# Patient Record
Sex: FEMALE | Race: WHITE | NOT HISPANIC OR LATINO
[De-identification: names, ages, dates, MRNs, and addresses within clinical notes are randomized per-mention and may not be internally consistent; named-entity substitution may affect disease eponyms.]

---

## 2022-11-14 ENCOUNTER — NON-APPOINTMENT (OUTPATIENT)
Age: 67
End: 2022-11-14

## 2023-07-24 PROBLEM — Z00.00 ENCOUNTER FOR PREVENTIVE HEALTH EXAMINATION: Status: ACTIVE | Noted: 2023-07-24

## 2023-08-03 ENCOUNTER — APPOINTMENT (OUTPATIENT)
Dept: ORTHOPEDIC SURGERY | Facility: CLINIC | Age: 68
End: 2023-08-03
Payer: MEDICARE

## 2023-08-03 DIAGNOSIS — S82.091A OTHER FRACTURE OF RIGHT PATELLA, INITIAL ENCOUNTER FOR CLOSED FRACTURE: ICD-10-CM

## 2023-08-03 DIAGNOSIS — Z78.9 OTHER SPECIFIED HEALTH STATUS: ICD-10-CM

## 2023-08-03 PROCEDURE — 99204 OFFICE O/P NEW MOD 45 MIN: CPT

## 2023-08-03 PROCEDURE — 73562 X-RAY EXAM OF KNEE 3: CPT | Mod: RT

## 2023-08-03 PROCEDURE — 99214 OFFICE O/P EST MOD 30 MIN: CPT

## 2023-08-03 RX ORDER — ALBUTEROL SULFATE 4 MG/1
TABLET ORAL
Refills: 0 | Status: ACTIVE | COMMUNITY

## 2023-08-03 RX ORDER — FLUTICASONE PROPIONATE AND SALMETEROL XINAFOATE 230; 21 UG/1; UG/1
AEROSOL, METERED RESPIRATORY (INHALATION)
Refills: 0 | Status: ACTIVE | COMMUNITY

## 2023-08-03 RX ORDER — ROSUVASTATIN CALCIUM 5 MG/1
TABLET, FILM COATED ORAL
Refills: 0 | Status: ACTIVE | COMMUNITY

## 2023-08-03 RX ORDER — OLMESARTAN MEDOXOMIL-HYDROCHLOROTHIAZIDE 25; 40 MG/1; MG/1
TABLET, FILM COATED ORAL
Refills: 0 | Status: ACTIVE | COMMUNITY

## 2023-08-03 NOTE — PHYSICAL EXAM
[4___] : hamstring 4[unfilled]/5 [Right] : right knee [] : patient ambulates without assistive device [Lateral] : lateral [[place]ard] : skyline [AP Standing] : anteroposterior standing [Moderate patellofemoral OA] : Moderate patellofemoral OA [FreeTextEntry9] : possible fracture through superior patella osteophyte [TWNoteComboBox7] : flexion 100 degrees
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
## 2023-09-14 ENCOUNTER — APPOINTMENT (OUTPATIENT)
Dept: ORTHOPEDIC SURGERY | Facility: CLINIC | Age: 68
End: 2023-09-14
Payer: MEDICARE

## 2023-09-14 DIAGNOSIS — S80.01XA CONTUSION OF RIGHT KNEE, INITIAL ENCOUNTER: ICD-10-CM

## 2023-09-14 DIAGNOSIS — M94.261 CHONDROMALACIA, RIGHT KNEE: ICD-10-CM

## 2023-09-14 PROCEDURE — 99213 OFFICE O/P EST LOW 20 MIN: CPT

## 2023-10-26 ENCOUNTER — APPOINTMENT (OUTPATIENT)
Dept: ORTHOPEDIC SURGERY | Facility: CLINIC | Age: 68
End: 2023-10-26

## 2024-06-17 ENCOUNTER — APPOINTMENT (OUTPATIENT)
Dept: ORTHOPEDIC SURGERY | Facility: CLINIC | Age: 69
End: 2024-06-17
Payer: MEDICARE

## 2024-06-17 DIAGNOSIS — E78.00 PURE HYPERCHOLESTEROLEMIA, UNSPECIFIED: ICD-10-CM

## 2024-06-17 DIAGNOSIS — J45.909 UNSPECIFIED ASTHMA, UNCOMPLICATED: ICD-10-CM

## 2024-06-17 DIAGNOSIS — M84.375A STRESS FRACTURE, LEFT FOOT, INITIAL ENCOUNTER FOR FRACTURE: ICD-10-CM

## 2024-06-17 DIAGNOSIS — I10 ESSENTIAL (PRIMARY) HYPERTENSION: ICD-10-CM

## 2024-06-17 PROCEDURE — 99213 OFFICE O/P EST LOW 20 MIN: CPT | Mod: 25

## 2024-06-17 PROCEDURE — 73630 X-RAY EXAM OF FOOT: CPT | Mod: LT

## 2024-06-17 PROCEDURE — L4387: CPT | Mod: LT

## 2024-06-17 NOTE — HISTORY OF PRESENT ILLNESS
[de-identified] : Patient presents for LT ankle pain evaluation. Patient states she goes for long walks often. Patient states she gets pain in the ankle and the top of the foot. Patient states that she has been having pain off and on for about 6 months. Patient states that most of her pain is in the top of the foot.

## 2024-06-17 NOTE — DISCUSSION/SUMMARY
[de-identified] : I reviewed all diagnostic and physical findings, the anatomy and pathology were discussed and the patient understands. All questions were answered and the patient left pleased.  The patient was advised to call for an appointment to follow up after MRI evaluation.

## 2024-06-17 NOTE — PHYSICAL EXAM
[Left] : left foot and ankle [Mild] : mild swelling of MTP joint/great toe [1st] : 1st [NL (40)] : plantar flexion 40 degrees [NL 30)] : inversion 30 degrees [NL (20)] : eversion 20 degrees [5___] : eversion 5[unfilled]/5 [1+] : posterior tibialis pulse: 1+ [] : mildly antalgic [de-identified] : pain to medial; arch metatarsal

## 2024-06-27 ENCOUNTER — APPOINTMENT (OUTPATIENT)
Dept: ORTHOPEDIC SURGERY | Facility: CLINIC | Age: 69
End: 2024-06-27
Payer: MEDICARE

## 2024-06-27 DIAGNOSIS — M79.672 PAIN IN LEFT FOOT: ICD-10-CM

## 2024-06-27 DIAGNOSIS — G89.29 PAIN IN LEFT FOOT: ICD-10-CM

## 2024-06-27 PROCEDURE — 99213 OFFICE O/P EST LOW 20 MIN: CPT
